# Patient Record
Sex: FEMALE | URBAN - METROPOLITAN AREA
[De-identification: names, ages, dates, MRNs, and addresses within clinical notes are randomized per-mention and may not be internally consistent; named-entity substitution may affect disease eponyms.]

---

## 2019-01-31 ENCOUNTER — VIRTUAL VISIT (OUTPATIENT)
Dept: FAMILY MEDICINE | Facility: OTHER | Age: 44
End: 2019-01-31

## 2019-02-01 NOTE — PROGRESS NOTES
"Date:   Clinician: Stas Fletcher  Clinician NPI: [id number]  Patient: Ludy Cedillo  Patient : 1975  Patient Address: [address] [place] [address]. [place] [address]  Patient Phone: [phone]  Visit Protocol: General skin conditions  Patient Summary:  Ludy is a 44 year old ( : 1975 ) female who initiated a Visit for evaluation of an unspecified skin condition. When asked the question \"Please sign me up to receive news, health information and promotions from [employer].\", Ludy responded \"No\".    Images of her skin condition were uploaded.  Her symptoms started 1-3 days ago and affect the right side of her body. The skin condition is located on her hands. The skin condition is red in color.   The affected area has scabs, sores, and blisters. It feels tender to touch, burning, painful, itchy, and warm to touch. The symptoms interfere with her sleep.   Symptom details     Redness: The redness has not rapidly increased in size.    Blisters: Ludy has only a few blisters.    Pain: The pain is severe (between 7-9 on a 10 point pain scale).     The skin condition has changed since the symptoms started. Description of changes as reported by the patient (free text): A blister has formed from a box cut. My finger is now throbbing red and warm and very painful.   Denied symptoms include pimples, numbness, crusts, hives, dry/flaky skin, and drainage. Ludy does not feel feverish. She does not have a rash in the shape of a bull's-eye.   Treatments or home remedies used to relieve the symptoms as reported by the patient (free text): Vinegar water and lotion  Was not helpful   Precipitating events   Ludy did not come in contact with any irritants prior to the onset of her symptoms and has not been in close contact with anyone that has similar symptoms. She also did not spend time in a wooded area, swim, travel, or spend excess time in the sun just before her symptoms started. Ludy did not get bitten " or stung by an insect.   Pertinent medical history  Ludy has not experienced this skin condition before.   Ludy has had chickenpox, but has not had shingles in the past.   Ludy has a history of seasonal allergies or hay fever.   Ongoing medical conditions were denied.   Weight: 130 lbs   Ludy does not smoke or use smokeless tobacco.   She denies pregnancy and denies breastfeeding. She has menstruated in the past month.   Additional information as reported by the patient (free text): The finger continues to swell and throb.   MEDICATIONS: No current medications, ALLERGIES: NKDA  Clinician Response:  Dear Ludy,   Medication information  Unless you are allergic to the over-the-counter medication(s) below, I recommend using:     Acetaminophen (Tylenol or store brand) oral tablet. Take 1-2 tablets by mouth every 4-6 hours to control pain.   Over-the-counter medications do not require a prescription. Ask the pharmacist if you have any questions.  Keflex 500mg tid for 10 days   Diagnosis: Cellulitis of unspecified finger  Diagnosis ICD: L03.019  Prescription: cephalexin (Keflex) 500 mg oral capsule 30 capsule, 10 days supply. Take 1 capsule by mouth every 8 hours for 10 days. Refills: 0, Refill as needed: no, Allow substitutions: yes  Pharmacy: [facility] [id number] - [phone] - [address] [phone]